# Patient Record
Sex: FEMALE | Race: WHITE
[De-identification: names, ages, dates, MRNs, and addresses within clinical notes are randomized per-mention and may not be internally consistent; named-entity substitution may affect disease eponyms.]

---

## 2020-04-02 ENCOUNTER — HOSPITAL ENCOUNTER (EMERGENCY)
Dept: HOSPITAL 25 - UCCORT | Age: 55
Discharge: HOME | End: 2020-04-02
Payer: COMMERCIAL

## 2020-04-02 DIAGNOSIS — B34.9: Primary | ICD-10-CM

## 2020-04-02 DIAGNOSIS — Z20.828: ICD-10-CM

## 2020-04-02 PROCEDURE — G0463 HOSPITAL OUTPT CLINIC VISIT: HCPCS

## 2020-04-02 PROCEDURE — 99211 OFF/OP EST MAY X REQ PHY/QHP: CPT

## 2020-04-02 PROCEDURE — 87635 SARS-COV-2 COVID-19 AMP PRB: CPT

## 2020-04-02 NOTE — [facility]


HPI Summary: 





53 yo treated for sinusitis with an antibiotic 


--persistent cough for weeks, with onset about 5 weeks ago. At that time she 

was diagnosed with sinusitis. Treated with doxy for 7 days beginning on 2/27/ 20.  with improved sinus symptoms but the 


--no fever at that time. 


--2.5 weeks ago developed a temp of 100.3 with persistent cough. Given an 

additional antibiotic at that time, which she finished on 3/30/20 (treated with 

augmentin). Low grade fever persisted for 5 days, but is now resolved. 


--2 weeks ago she had some diarrhea and gi upset which is now resolved. 


--has continued cough and she has persistent shortness of breath in the 

morning. At that time, she gets a prolonged bout of irritating cough without 

production. 


--her workplace is concerned about the persistence of the cough. 


--symptoms have not been suggestive of allergies which she has had in the past


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 

masks. No positive exposure to COVID-19. 


--she has had myalgias at the same time as the fever. 


--uses tessalon at hs and sleeps ok, some mild coughing overnight. 


--energy level is low although a bit improved. Fatigues easily. 


NO past hx of asthma or pneumonia. 





[facility] PMH


Previously Healthy: Yes


Endocrine/Hematology History: 


   Denies: Hx Diabetes





- Surgical History


Surgery Procedure, Year, and Place: lithotripsy.  huey.  appy


Infectious Disease History: No





- Social History


Alcohol Use: None


Substance Use Type: Reports: None


Smoking Status (MU): Never Smoked Tobacco





[facility] ROS


All Other Systems Reviewed And Are Negative: Yes





 Telehealth PE


Appearance: Positive: Ill-Appearing - looks mildly fatigued, but able to speak 

full sentences. Color pink; not tachypneic. 


Respiratory/Lung Sounds: Positive: Normal Respiratory Effort


Psychiatric: Positive: Normal





 Telehealth Course/Dx


Assessment/Plan: 





Discussed pros and cons of testing, and she would like testing given the 

fluctuating course of her symptoms and the nature of her nursing home work. 


She will rest at home in isolation. 


If she develops shortness of breath or fever, she will return for in room 

assessment and CXR. Continue tessalon perles for cough suppression. 


Provider Diagnoses: 


 Viral syndrome








[facility] Tele[facility] Disposition


Provider Recommendation for Treatment: Urgent Care


Telehealth Visit: Patient Consented Verbally to Telehealth Visit


Telehealth Patient Statement: The patient should understand that they are 

communicating with their provider via a secure communication platform and that 

all the same privacy and confidentiality rules apply. They will also be 

responsible for copayments or coinsurances that apply to any Telehealth visit.


Patient Identifiers: 2 Patient Identifiers Verified for Telehealth Visit - name 

and date of birth


Telehealth Visit Start Time: 13:10


Telehealth Visit End Time: 13:25


Telehealth Provider Attestation: The above services were appropriate to provide 

in a Telehealth setting.





- Attestation Statements


Document Initiated by Dayne: No